# Patient Record
Sex: FEMALE | Race: BLACK OR AFRICAN AMERICAN
[De-identification: names, ages, dates, MRNs, and addresses within clinical notes are randomized per-mention and may not be internally consistent; named-entity substitution may affect disease eponyms.]

---

## 2021-07-08 ENCOUNTER — HOSPITAL ENCOUNTER (EMERGENCY)
Dept: HOSPITAL 35 - ER | Age: 86
Discharge: HOME | End: 2021-07-08
Payer: COMMERCIAL

## 2021-07-08 VITALS — DIASTOLIC BLOOD PRESSURE: 56 MMHG | SYSTOLIC BLOOD PRESSURE: 144 MMHG

## 2021-07-08 VITALS — WEIGHT: 134 LBS | BODY MASS INDEX: 23.74 KG/M2 | HEIGHT: 63 IN

## 2021-07-08 DIAGNOSIS — R42: ICD-10-CM

## 2021-07-08 DIAGNOSIS — Z88.1: ICD-10-CM

## 2021-07-08 DIAGNOSIS — M19.90: ICD-10-CM

## 2021-07-08 DIAGNOSIS — R07.89: ICD-10-CM

## 2021-07-08 DIAGNOSIS — N39.0: ICD-10-CM

## 2021-07-08 DIAGNOSIS — Z79.899: ICD-10-CM

## 2021-07-08 DIAGNOSIS — I10: Primary | ICD-10-CM

## 2021-07-08 DIAGNOSIS — Z79.82: ICD-10-CM

## 2021-07-08 LAB
ALBUMIN SERPL-MCNC: 3.6 G/DL (ref 3.4–5)
ALT SERPL-CCNC: 15 U/L (ref 14–59)
AMYLASE SERPL-CCNC: 57 U/L (ref 25–115)
ANION GAP SERPL CALC-SCNC: 11 MMOL/L (ref 7–16)
AST SERPL-CCNC: 23 U/L (ref 15–37)
BACTERIA-REFLEX: (no result) /HPF
BASOPHILS NFR BLD AUTO: 0.7 % (ref 0–2)
BILIRUB DIRECT SERPL-MCNC: 0.2 MG/DL
BILIRUB SERPL-MCNC: 0.9 MG/DL (ref 0.2–1)
BILIRUB UR-MCNC: NEGATIVE MG/DL
BUN SERPL-MCNC: 12 MG/DL (ref 7–18)
CALCIUM SERPL-MCNC: 9.3 MG/DL (ref 8.5–10.1)
CHLORIDE SERPL-SCNC: 106 MMOL/L (ref 98–107)
CO2 SERPL-SCNC: 24 MMOL/L (ref 21–32)
COLOR UR: YELLOW
CREAT SERPL-MCNC: 0.8 MG/DL (ref 0.6–1)
EOSINOPHIL NFR BLD: 1.5 % (ref 0–3)
ERYTHROCYTE [DISTWIDTH] IN BLOOD BY AUTOMATED COUNT: 13 % (ref 10.5–14.5)
GLUCOSE SERPL-MCNC: 101 MG/DL (ref 74–106)
GRANULOCYTES NFR BLD MANUAL: 52.8 % (ref 36–66)
HCT VFR BLD CALC: 40.7 % (ref 37–47)
HGB BLD-MCNC: 13.9 GM/DL (ref 12–15)
KETONES UR STRIP-MCNC: (no result) MG/DL
LIPASE: 90 U/L (ref 73–393)
LYMPHOCYTES NFR BLD AUTO: 34.4 % (ref 24–44)
MAGNESIUM SERPL-MCNC: 1.8 MG/DL (ref 1.8–2.4)
MCH RBC QN AUTO: 31.1 PG (ref 26–34)
MCHC RBC AUTO-ENTMCNC: 34.2 G/DL (ref 28–37)
MCV RBC: 90.9 FL (ref 80–100)
MONOCYTES NFR BLD: 10.6 % (ref 1–8)
MUCUS: (no result) STRN/LPF
NEUTROPHILS # BLD: 3.5 THOU/UL (ref 1.4–8.2)
PHOSPHATE SERPL-MCNC: 3.1 MG/DL (ref 2.6–4.7)
PLATELET # BLD: 206 THOU/UL (ref 150–400)
POTASSIUM SERPL-SCNC: 3.9 MMOL/L (ref 3.5–5.1)
PROT SERPL-MCNC: 7.6 G/DL (ref 6.4–8.2)
RBC # BLD AUTO: 4.47 MIL/UL (ref 4.2–5)
RBC # UR STRIP: (no result) /UL
RBC #/AREA URNS HPF: (no result) /HPF
SODIUM SERPL-SCNC: 141 MMOL/L (ref 136–145)
SP GR UR STRIP: >= 1.03 (ref 1–1.03)
SQUAMOUS: (no result) /LPF (ref 0–3)
TROPONIN I SERPL-MCNC: <0.06 NG/ML (ref ?–0.06)
URINE CLARITY: CLEAR
URINE GLUCOSE-RANDOM*: NEGATIVE
URINE LEUKOCYTES-REFLEX: (no result)
URINE NITRITE-REFLEX: NEGATIVE
URINE PROTEIN (DIPSTICK): NEGATIVE
URINE WBC-REFLEX: (no result) /HPF (ref 0–5)
UROBILINOGEN UR STRIP-ACNC: 0.2 E.U./DL (ref 0.2–1)
WBC # BLD AUTO: 6.7 THOU/UL (ref 4–11)

## 2021-07-09 NOTE — EKG
[address] [employer]
[place]  [address]
Phone:  [phone]                    ELECTROCARDIOGRAM REPORT      
_______________________________________________________________________________
 
Name:       LEIGHA MELGAR                   Room #:                     DEP   
AJ#:      [id number]     Account #:      [id number]  
Admission:  21    Attend Phys:                          
Discharge:  21    Date of Birth:  [birth year]  
                                                          Report #: [id number]
   [id number]
_______________________________________________________________________________
                         [facility] ED
                                       
Test Date:    2021               Test Time:    16:28:35
Pat Name:     LEIGHA MELGAR              Department:   
Patient ID:   [id number]            Room:          
Gender:       F                        Technician:   KF
:          [id number]               Requested By: Miguelito Mccann
Order Number: [id number] MD:   Calvin Flores
                                 Measurements
Intervals                              Axis          
Rate:         57                       P:            47
NH:           120                      QRS:          40
QRSD:         96                       T:            44
QT:           455                                    
QTc:          443                                    
                           Interpretive Statements
Sinus rhythm
Left ventricular hypertrophy
No previous ECG available for comparison
Electronically Signed On 2021 7:10:54 CDT by Calvin Flores
[email or web address]
 
 
 
 
 
 
 
 
 
 
 
 
 
 
 
 
 
 
 
 
 
 
  <ELECTRONICALLY SIGNED>
   By: Calvin Flores MD, [facility]    
  21     0710
D: 21 1628                           _____________________________________
T: 21 1628                           Calvin Flores MD, FACC      /EPI